# Patient Record
Sex: FEMALE | Race: BLACK OR AFRICAN AMERICAN | URBAN - METROPOLITAN AREA
[De-identification: names, ages, dates, MRNs, and addresses within clinical notes are randomized per-mention and may not be internally consistent; named-entity substitution may affect disease eponyms.]

---

## 2024-06-27 ENCOUNTER — APPOINTMENT (RX ONLY)
Dept: URBAN - METROPOLITAN AREA CLINIC 166 | Facility: CLINIC | Age: 34
Setting detail: DERMATOLOGY
End: 2024-06-27

## 2024-06-27 DIAGNOSIS — L74.51 PRIMARY FOCAL HYPERHIDROSIS: ICD-10-CM | Status: UNCHANGED

## 2024-06-27 DIAGNOSIS — L259 CONTACT DERMATITIS AND OTHER ECZEMA, UNSPECIFIED CAUSE: ICD-10-CM

## 2024-06-27 DIAGNOSIS — L57.8 OTHER SKIN CHANGES DUE TO CHRONIC EXPOSURE TO NONIONIZING RADIATION: ICD-10-CM

## 2024-06-27 DIAGNOSIS — Z71.89 OTHER SPECIFIED COUNSELING: ICD-10-CM

## 2024-06-27 DIAGNOSIS — L73.9 FOLLICULAR DISORDER, UNSPECIFIED: ICD-10-CM

## 2024-06-27 DIAGNOSIS — L82.1 OTHER SEBORRHEIC KERATOSIS: ICD-10-CM

## 2024-06-27 DIAGNOSIS — L663 OTHER SPECIFIED DISEASES OF HAIR AND HAIR FOLLICLES: ICD-10-CM

## 2024-06-27 DIAGNOSIS — D22 MELANOCYTIC NEVI: ICD-10-CM

## 2024-06-27 DIAGNOSIS — D18.0 HEMANGIOMA: ICD-10-CM

## 2024-06-27 DIAGNOSIS — L738 OTHER SPECIFIED DISEASES OF HAIR AND HAIR FOLLICLES: ICD-10-CM

## 2024-06-27 DIAGNOSIS — L81.4 OTHER MELANIN HYPERPIGMENTATION: ICD-10-CM

## 2024-06-27 PROBLEM — L02.426 FURUNCLE OF LEFT LOWER LIMB: Status: ACTIVE | Noted: 2024-06-27

## 2024-06-27 PROBLEM — L23.9 ALLERGIC CONTACT DERMATITIS, UNSPECIFIED CAUSE: Status: ACTIVE | Noted: 2024-06-27

## 2024-06-27 PROBLEM — D18.01 HEMANGIOMA OF SKIN AND SUBCUTANEOUS TISSUE: Status: ACTIVE | Noted: 2024-06-27

## 2024-06-27 PROBLEM — D22.9 MELANOCYTIC NEVI, UNSPECIFIED: Status: ACTIVE | Noted: 2024-06-27

## 2024-06-27 PROBLEM — L74.519 PRIMARY FOCAL HYPERHIDROSIS, UNSPECIFIED: Status: ACTIVE | Noted: 2024-06-27

## 2024-06-27 PROBLEM — L02.425 FURUNCLE OF RIGHT LOWER LIMB: Status: ACTIVE | Noted: 2024-06-27

## 2024-06-27 PROCEDURE — ? PRESCRIPTION MEDICATION MANAGEMENT

## 2024-06-27 PROCEDURE — ? COUNSELING

## 2024-06-27 PROCEDURE — ? RECOMMENDATIONS

## 2024-06-27 PROCEDURE — ? PRESCRIPTION

## 2024-06-27 PROCEDURE — 99203 OFFICE O/P NEW LOW 30 MIN: CPT

## 2024-06-27 RX ORDER — ALUMINUM CHLORIDE 20 %
SOLUTION, NON-ORAL TOPICAL
Qty: 37.5 | Refills: 2 | Status: ERX | COMMUNITY
Start: 2024-06-27

## 2024-06-27 RX ORDER — CLOBETASOL PROPIONATE 0.25 MG/G
CREAM TOPICAL
Qty: 30 | Refills: 2 | Status: ERX | COMMUNITY
Start: 2024-06-27

## 2024-06-27 RX ORDER — GLYCOPYRROLATE 1 MG/1
TABLET ORAL
Qty: 120 | Refills: 0 | Status: ERX | COMMUNITY
Start: 2024-06-27

## 2024-06-27 RX ADMIN — GLYCOPYRROLATE: 1 TABLET ORAL at 00:00

## 2024-06-27 RX ADMIN — CLOBETASOL PROPIONATE: 0.25 CREAM TOPICAL at 00:00

## 2024-06-27 RX ADMIN — Medication: at 00:00

## 2024-06-27 ASSESSMENT — LOCATION DETAILED DESCRIPTION DERM
LOCATION DETAILED: LEFT ANTERIOR PROXIMAL THIGH
LOCATION DETAILED: RIGHT ANTERIOR PROXIMAL THIGH
LOCATION DETAILED: RIGHT INFERIOR POSTERIOR NECK

## 2024-06-27 ASSESSMENT — LOCATION ZONE DERM
LOCATION ZONE: LEG
LOCATION ZONE: NECK

## 2024-06-27 ASSESSMENT — LOCATION SIMPLE DESCRIPTION DERM
LOCATION SIMPLE: LEFT THIGH
LOCATION SIMPLE: RIGHT THIGH
LOCATION SIMPLE: POSTERIOR NECK

## 2024-06-27 NOTE — PROCEDURE: RECOMMENDATIONS
Recommendations (Free Text): Lume\\nCarpe
Render Risk Assessment In Note?: no
Detail Level: Zone
Recommendations (Free Text): Laser hair removal

## 2024-06-27 NOTE — HPI: SWEATING (HYPERHIDROSIS)
Is This A New Presentation, Or A Follow-Up?: Sweating
How Severe Is It?: mild
Sweating Severity Scale: 4- The sweating is intolerable and always interferes with daily activities

## 2024-06-27 NOTE — PROCEDURE: PRESCRIPTION MEDICATION MANAGEMENT
Initiate Treatment: Robinul- start off with one in the morning, then work up to twice a day, then three times a day \\nDrysol- apply to affected areas at night
Render In Strict Bullet Format?: No
Detail Level: Zone
Initiate Treatment: Apply Clobetasol to back of neck as needed for itch
Initiate Treatment: BPO wash

## 2024-06-27 NOTE — PROCEDURE: COUNSELING
Detail Level: Generalized
Topical Retinoids Recommendations: Pea sized amount of retinoid in evenings (at least 10 mins after washing face) as tolerated
Sunscreen Recommendations: Eryfotona Actinica or Eryfotona Ageless can improve appearance of skin and slow/decrease growth or pre-cancerous lesions
Detail Level: Zone
Nicotinamide Supplementation Recommendations: Heliocare Advanced Daily
Bleaching Agents Recommendations: Hydroquinone should be used 3 months on 3 months off\\nIf used continually it can cause pigmentation in the skin that will never resolve
Laser Recommendations: Consult with 
Chemical Peel Recommendations: VI peel
Sunscreen Recommendations: SPF 30+ or higher daily
Skin Checks Recommendations: monthly
Detail Level: Detailed
Antiperspirant Recommendations: Carpe hand/foot lotion\\nCertain Dri OTC
Medical Necessity Clause: Botulinum toxin hyperhidrosis therapy is medically necessary because
Medical Necessity Information: LCD Guidelines vary from payer to payer. Please check with your payer's policy to determine medical necessity.

## 2024-06-28 ENCOUNTER — RX ONLY (OUTPATIENT)
Age: 34
Setting detail: RX ONLY
End: 2024-06-28

## 2024-06-28 RX ORDER — CLOBETASOL PROPIONATE 0.5 MG/G
CREAM TOPICAL
Qty: 60 | Refills: 1 | Status: ERX | COMMUNITY
Start: 2024-06-28

## 2024-08-08 ENCOUNTER — APPOINTMENT (RX ONLY)
Dept: URBAN - METROPOLITAN AREA CLINIC 166 | Facility: CLINIC | Age: 34
Setting detail: DERMATOLOGY
End: 2024-08-08

## 2024-08-08 DIAGNOSIS — L259 CONTACT DERMATITIS AND OTHER ECZEMA, UNSPECIFIED CAUSE: ICD-10-CM | Status: RESOLVED

## 2024-08-08 DIAGNOSIS — L738 OTHER SPECIFIED DISEASES OF HAIR AND HAIR FOLLICLES: ICD-10-CM | Status: RESOLVING

## 2024-08-08 DIAGNOSIS — L663 OTHER SPECIFIED DISEASES OF HAIR AND HAIR FOLLICLES: ICD-10-CM | Status: RESOLVING

## 2024-08-08 DIAGNOSIS — L74.51 PRIMARY FOCAL HYPERHIDROSIS: ICD-10-CM | Status: INADEQUATELY CONTROLLED

## 2024-08-08 DIAGNOSIS — L72.8 OTHER FOLLICULAR CYSTS OF THE SKIN AND SUBCUTANEOUS TISSUE: ICD-10-CM

## 2024-08-08 DIAGNOSIS — L73.9 FOLLICULAR DISORDER, UNSPECIFIED: ICD-10-CM | Status: RESOLVING

## 2024-08-08 PROBLEM — L02.425 FURUNCLE OF RIGHT LOWER LIMB: Status: ACTIVE | Noted: 2024-08-08

## 2024-08-08 PROBLEM — L74.519 PRIMARY FOCAL HYPERHIDROSIS, UNSPECIFIED: Status: ACTIVE | Noted: 2024-08-08

## 2024-08-08 PROBLEM — L02.426 FURUNCLE OF LEFT LOWER LIMB: Status: ACTIVE | Noted: 2024-08-08

## 2024-08-08 PROBLEM — L23.9 ALLERGIC CONTACT DERMATITIS, UNSPECIFIED CAUSE: Status: ACTIVE | Noted: 2024-08-08

## 2024-08-08 PROCEDURE — ? PRESCRIPTION MEDICATION MANAGEMENT

## 2024-08-08 PROCEDURE — ? DEFER

## 2024-08-08 PROCEDURE — ? RECOMMENDATIONS

## 2024-08-08 PROCEDURE — ? COUNSELING

## 2024-08-08 PROCEDURE — 99214 OFFICE O/P EST MOD 30 MIN: CPT

## 2024-08-08 PROCEDURE — ? PRESCRIPTION

## 2024-08-08 RX ORDER — GLYCOPYRROLATE 0.2 MG/ML
INJECTION, SOLUTION INTRAMUSCULAR; INTRAVENOUS
Qty: 15 | Refills: 1 | Status: ERX | COMMUNITY
Start: 2024-08-08

## 2024-08-08 RX ADMIN — GLYCOPYRROLATE: 0.2 INJECTION, SOLUTION INTRAMUSCULAR; INTRAVENOUS at 00:00

## 2024-08-08 ASSESSMENT — LOCATION SIMPLE DESCRIPTION DERM
LOCATION SIMPLE: RIGHT THIGH
LOCATION SIMPLE: GROIN
LOCATION SIMPLE: POSTERIOR NECK
LOCATION SIMPLE: LEFT THIGH

## 2024-08-08 ASSESSMENT — LOCATION DETAILED DESCRIPTION DERM
LOCATION DETAILED: RIGHT INGUINAL CREASE
LOCATION DETAILED: RIGHT INFERIOR POSTERIOR NECK
LOCATION DETAILED: LEFT ANTERIOR PROXIMAL THIGH
LOCATION DETAILED: RIGHT ANTERIOR PROXIMAL THIGH

## 2024-08-08 ASSESSMENT — LOCATION ZONE DERM
LOCATION ZONE: TRUNK
LOCATION ZONE: NECK
LOCATION ZONE: LEG

## 2024-08-08 NOTE — PROCEDURE: COUNSELING
Detail Level: Zone
Antiperspirant Recommendations: Carpe hand/foot lotion\\nCertain Dri OTC
Medical Necessity Clause: Botulinum toxin hyperhidrosis therapy is medically necessary because
Medical Necessity Information: LCD Guidelines vary from payer to payer. Please check with your payer's policy to determine medical necessity.
Detail Level: Detailed
Detail Level: Simple

## 2024-08-08 NOTE — PROCEDURE: PRESCRIPTION MEDICATION MANAGEMENT
Discontinue Regimen: Drysol- apply to affected areas at night
Initiate Treatment: Glycopyrrolate compound  - apply to sweaty areas
Render In Strict Bullet Format?: No
Detail Level: Zone
Continue Regimen: Apply Clobetasol to back of neck as needed for itch
Initiate Treatment: BPO wash

## 2025-03-19 ENCOUNTER — APPOINTMENT (OUTPATIENT)
Dept: URBAN - METROPOLITAN AREA CLINIC 166 | Facility: CLINIC | Age: 35
Setting detail: DERMATOLOGY
End: 2025-03-19

## 2025-03-19 DIAGNOSIS — L73.9 FOLLICULAR DISORDER, UNSPECIFIED: ICD-10-CM | Status: WORSENING

## 2025-03-19 PROCEDURE — ? PRESCRIPTION

## 2025-03-19 PROCEDURE — ? COUNSELING

## 2025-03-19 PROCEDURE — ? PRESCRIPTION MEDICATION MANAGEMENT

## 2025-03-19 PROCEDURE — ? RECOMMENDATIONS

## 2025-03-19 PROCEDURE — 99213 OFFICE O/P EST LOW 20 MIN: CPT

## 2025-03-19 RX ORDER — BENZOYL PEROXIDE 10 G/100G
SUSPENSION TOPICAL
Qty: 227 | Refills: 1 | Status: ERX | COMMUNITY
Start: 2025-03-19

## 2025-03-19 RX ORDER — CLINDAMYCIN PHOSPHATE 10 MG/G
GEL TOPICAL DAILY
Qty: 30 | Refills: 2 | Status: ERX | COMMUNITY
Start: 2025-03-19

## 2025-03-19 RX ADMIN — CLINDAMYCIN PHOSPHATE: 10 GEL TOPICAL at 00:00

## 2025-03-19 RX ADMIN — BENZOYL PEROXIDE: 10 SUSPENSION TOPICAL at 00:00

## 2025-03-19 ASSESSMENT — LOCATION DETAILED DESCRIPTION DERM: LOCATION DETAILED: LEFT INGUINAL CREASE

## 2025-03-19 ASSESSMENT — LOCATION ZONE DERM: LOCATION ZONE: TRUNK

## 2025-03-19 ASSESSMENT — LOCATION SIMPLE DESCRIPTION DERM: LOCATION SIMPLE: GROIN

## 2025-03-19 NOTE — PROCEDURE: RECOMMENDATIONS
Render Risk Assessment In Note?: no
Detail Level: Zone
Recommendations (Free Text): BPO wash use daily.\\nClindamycin gel twice daily x 7 days, then with flares.\\nLaser hair removal discuss in detail.  Can exacerbate at 36 period menopause and with hormone change.\\nDiscuss seeing Dr. Vanesa Machado.

## 2025-03-19 NOTE — PROCEDURE: MIPS QUALITY
Quality 226: Preventive Care And Screening: Tobacco Use: Screening And Cessation Intervention: Patient screened for tobacco use and is an ex/non-smoker
Detail Level: Generalized
Alert and oriented to person, place and time

## 2025-03-19 NOTE — HPI: SKIN LESION
Is This A New Presentation, Or A Follow-Up?: Skin Lesions
How Severe Is Your Skin Lesion?: moderate
Has Your Skin Lesion Been Treated?: not been treated
Additional History: The patient use V fresh to wash with in the groin. The patient states that her skin flares before she get her wax.